# Patient Record
Sex: FEMALE | Race: WHITE | HISPANIC OR LATINO | URBAN - METROPOLITAN AREA
[De-identification: names, ages, dates, MRNs, and addresses within clinical notes are randomized per-mention and may not be internally consistent; named-entity substitution may affect disease eponyms.]

---

## 2021-03-01 ENCOUNTER — OFFICE VISIT (OUTPATIENT)
Dept: URBAN - METROPOLITAN AREA CLINIC 78 | Facility: CLINIC | Age: 56
End: 2021-03-01

## 2021-03-01 NOTE — HPI-OTHER HISTORIES
The patient was referred to us by . A copy of this note will be sent to the referring physician.  The patient has never had a colonoscopy previously. There is no FH of colon cancer or colon polyps.  There is no recent history of rectal bleeding. The patient has no pertinent additional complaints of abdominal pain, constipation, diarrhea, bloating, rectal pain, anorexia or unintentional weight loss.   Regarding any upper GI complaints, the patient has not had heartburn, nausea, vomiting or dysphagia.  	 The patient does not take blood thinners.  They deny any CP or MUNOZ.

## 2021-03-23 ENCOUNTER — OFFICE VISIT (OUTPATIENT)
Dept: URBAN - METROPOLITAN AREA CLINIC 78 | Facility: CLINIC | Age: 56
End: 2021-03-23
Payer: COMMERCIAL

## 2021-03-23 ENCOUNTER — DASHBOARD ENCOUNTERS (OUTPATIENT)
Age: 56
End: 2021-03-23

## 2021-03-23 DIAGNOSIS — Z12.11 COLON CANCER SCREENING: ICD-10-CM

## 2021-03-23 DIAGNOSIS — Z80.0 FAMILY HISTORY OF GASTRIC CANCER: ICD-10-CM

## 2021-03-23 DIAGNOSIS — K59.01 CONSTIPATION: ICD-10-CM

## 2021-03-23 DIAGNOSIS — E53.8 B12 DEFICIENCY: ICD-10-CM

## 2021-03-23 PROCEDURE — 99244 OFF/OP CNSLTJ NEW/EST MOD 40: CPT | Performed by: INTERNAL MEDICINE

## 2021-03-23 RX ORDER — SODIUM, POTASSIUM,MAG SULFATES 17.5-3.13G
354ML SOLUTION, RECONSTITUTED, ORAL ORAL
Qty: 354 MILLILITER | Refills: 0 | OUTPATIENT
Start: 2021-03-23 | End: 2021-03-24

## 2021-03-23 NOTE — HPI-TODAY'S VISIT:
The patient was referred to us by Dr. Carolin Hernandez for anemia. A copy of this note will be sent to the referring physician.  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 Her Hb was as low as 7. She is now receiving B12 IM monthly which has helped get her Hb under control.   She reports 2 episodes of rectal bleeding over the course of the past year- she remember she was straining and attribtued these episodes to hemorrhoids. As long as she is cogniscent of eating a high fiber diet and drinking fluids she has a soft formed BM daily. If she eats bread, then she gets a bit constipated.  The patient otherwise denies abdominal pain,  diarrhea, bloating, rectal pain, anorexia or unintentional weight loss.  Regarding any upper GI complaints, the patient has not had heartburn, nausea, vomiting or dysphagia.  The patient does not take blood thinners or NSAIDs. She has never had either EGD or colonoscopy previously. Her father had stomach cancer. Her uncle had colon cancer. There is no FH of colon polyps.

## 2021-04-03 PROBLEM — 14760008 CONSTIPATION: Status: ACTIVE | Noted: 2021-03-23

## 2021-04-30 LAB
ANTIPARIETAL CELL ANTIBODY: 92.8
FOLATE (FOLIC ACID), SERUM: 7.7
GASTRIN, SERUM: 1385
HEMATOCRIT: 42
HEMOGLOBIN: 13
INTRINSIC FACTOR ABS, SERUM: 1.1
MCH: 29.7
MCHC: 31
MCV: 96
NRBC: (no result)
PLATELETS: 269
RBC: 4.38
RDW: 13.1
VITAMIN B12: 442
WBC: 6.7

## 2021-05-13 ENCOUNTER — OFFICE VISIT (OUTPATIENT)
Dept: URBAN - METROPOLITAN AREA LAB 3 | Facility: LAB | Age: 56
End: 2021-05-13
Payer: COMMERCIAL

## 2021-05-13 DIAGNOSIS — K29.40 ATROPHIC GASTRITIS: ICD-10-CM

## 2021-05-13 DIAGNOSIS — Z12.11 COLON CANCER SCREENING: ICD-10-CM

## 2021-05-13 DIAGNOSIS — K63.89 BACTERIAL OVERGROWTH SYNDROME: ICD-10-CM

## 2021-05-13 PROCEDURE — 45385 COLONOSCOPY W/LESION REMOVAL: CPT | Performed by: INTERNAL MEDICINE

## 2021-05-13 PROCEDURE — 43239 EGD BIOPSY SINGLE/MULTIPLE: CPT | Performed by: INTERNAL MEDICINE

## 2021-05-28 ENCOUNTER — TELEPHONE ENCOUNTER (OUTPATIENT)
Dept: URBAN - METROPOLITAN AREA CLINIC 23 | Facility: CLINIC | Age: 56
End: 2021-05-28

## 2021-10-05 ENCOUNTER — TELEPHONE ENCOUNTER (OUTPATIENT)
Dept: URBAN - METROPOLITAN AREA SURGERY CENTER 30 | Facility: SURGERY CENTER | Age: 56
End: 2021-10-05

## 2021-11-10 ENCOUNTER — LAB OUTSIDE AN ENCOUNTER (OUTPATIENT)
Dept: URBAN - METROPOLITAN AREA CLINIC 78 | Facility: CLINIC | Age: 56
End: 2021-11-10